# Patient Record
(demographics unavailable — no encounter records)

---

## 2025-03-13 NOTE — HISTORY OF PRESENT ILLNESS
[FreeTextEntry1] : 44 year old female presents for her annual visit. Denies GYN complaints at this time.  menses is monthly, cycle length 26 days.  PMH: hyperparathyroidism and parathyroidectomy, tubal ligation.   works as an acupuncturist. seeing pregnant women, menopausal women, young ladies with irregular cycles, etc. located in [place].

## 2025-03-13 NOTE — PLAN
[FreeTextEntry1] : Patient to follow up in 1 year for annual GYN exam Mammogram & bilateral breast sonogram due: now, rx given. has been having mammo/sonos since she was 17 s/s fibroadenomas.   Colonoscopy due: 45- planning to reach out to a GI near her.  Bone density due: PM Pap ordered Hemoccult ordered All questions answered, patient is agreeable with plan.